# Patient Record
Sex: MALE | Race: WHITE
[De-identification: names, ages, dates, MRNs, and addresses within clinical notes are randomized per-mention and may not be internally consistent; named-entity substitution may affect disease eponyms.]

---

## 2018-01-26 ENCOUNTER — HOSPITAL ENCOUNTER (EMERGENCY)
Dept: HOSPITAL 95 - ER | Age: 78
Discharge: HOME | End: 2018-01-26
Payer: MEDICARE

## 2018-01-26 VITALS — HEIGHT: 74 IN | WEIGHT: 225 LBS | BODY MASS INDEX: 28.88 KG/M2

## 2018-01-26 DIAGNOSIS — K21.9: Primary | ICD-10-CM

## 2018-01-26 DIAGNOSIS — K44.9: ICD-10-CM

## 2018-01-26 DIAGNOSIS — M51.34: ICD-10-CM

## 2018-01-26 LAB
ALBUMIN SERPL BCP-MCNC: 4 G/DL (ref 3.4–5)
ALBUMIN/GLOB SERPL: 1.1 {RATIO} (ref 0.8–1.8)
ALT SERPL W P-5'-P-CCNC: 38 U/L (ref 12–78)
ANION GAP SERPL CALCULATED.4IONS-SCNC: 9 MMOL/L (ref 6–16)
AST SERPL W P-5'-P-CCNC: 18 U/L (ref 12–37)
BASOPHILS # BLD AUTO: 0.04 K/MM3 (ref 0–0.23)
BASOPHILS NFR BLD AUTO: 1 % (ref 0–2)
BILIRUB SERPL-MCNC: 0.3 MG/DL (ref 0.1–1)
BUN SERPL-MCNC: 20 MG/DL (ref 8–24)
CALCIUM SERPL-MCNC: 9.1 MG/DL (ref 8.5–10.1)
CHLORIDE SERPL-SCNC: 109 MMOL/L (ref 98–108)
CO2 SERPL-SCNC: 24 MMOL/L (ref 21–32)
CREAT SERPL-MCNC: 0.97 MG/DL (ref 0.6–1.2)
DEPRECATED RDW RBC AUTO: 42.3 FL (ref 35.1–46.3)
EOSINOPHIL # BLD AUTO: 0.11 K/MM3 (ref 0–0.68)
EOSINOPHIL NFR BLD AUTO: 2 % (ref 0–6)
ERYTHROCYTE [DISTWIDTH] IN BLOOD BY AUTOMATED COUNT: 12.3 % (ref 11.7–14.2)
GLOBULIN SER CALC-MCNC: 3.7 G/DL (ref 2.2–4)
GLUCOSE SERPL-MCNC: 117 MG/DL (ref 70–99)
HCT VFR BLD AUTO: 44.3 % (ref 37–53)
HGB BLD-MCNC: 15.2 G/DL (ref 13.5–17.5)
IMM GRANULOCYTES # BLD AUTO: 0.03 K/MM3 (ref 0–0.1)
IMM GRANULOCYTES NFR BLD AUTO: 0 % (ref 0–1)
LYMPHOCYTES # BLD AUTO: 1.22 K/MM3 (ref 0.84–5.2)
LYMPHOCYTES NFR BLD AUTO: 16 % (ref 21–46)
MCHC RBC AUTO-ENTMCNC: 34.3 G/DL (ref 31.5–36.5)
MCV RBC AUTO: 93 FL (ref 80–100)
MONOCYTES # BLD AUTO: 0.62 K/MM3 (ref 0.16–1.47)
MONOCYTES NFR BLD AUTO: 8 % (ref 4–13)
NEUTROPHILS # BLD AUTO: 5.41 K/MM3 (ref 1.96–9.15)
NEUTROPHILS NFR BLD AUTO: 73 % (ref 41–73)
NRBC # BLD AUTO: 0 K/MM3 (ref 0–0.02)
NRBC BLD AUTO-RTO: 0 /100 WBC (ref 0–0.2)
PLATELET # BLD AUTO: 200 K/MM3 (ref 150–400)
POTASSIUM SERPL-SCNC: 3.8 MMOL/L (ref 3.5–5.5)
PROT SERPL-MCNC: 7.7 G/DL (ref 6.4–8.2)
SODIUM SERPL-SCNC: 142 MMOL/L (ref 136–145)
TROPONIN I SERPL-MCNC: <0.015 NG/ML (ref 0–0.04)

## 2018-02-14 ENCOUNTER — HOSPITAL ENCOUNTER (OUTPATIENT)
Dept: HOSPITAL 95 - LAB | Age: 78
Discharge: HOME | End: 2018-02-14
Attending: DERMATOLOGY
Payer: MEDICARE

## 2018-02-14 DIAGNOSIS — D36.10: Primary | ICD-10-CM

## 2018-09-18 ENCOUNTER — HOSPITAL ENCOUNTER (OUTPATIENT)
Dept: HOSPITAL 95 - LAB SHORT | Age: 78
Discharge: HOME | End: 2018-09-18
Attending: DERMATOLOGY
Payer: MEDICARE

## 2018-09-18 DIAGNOSIS — D48.5: Primary | ICD-10-CM

## 2020-03-12 NOTE — NUR
1345 PATIENT UP TO THE RESTROOM AND DRESSED SELF. ALL BELONGINGS GATHERED BY
PATIENT. PIV REMOVED FROM THE LEFT AC AND TR BAND REMOVED, SITE CLEANED AND
CLOTH DOT PLACED. NO HEMATOMA, NO BLEEDING, NO PAIN NOTED. NO BRUISING NOTED.
REVIEWED AGAIN DISCHARGE INSTRUCTIONS, LIMITS ON MOVEMENT AND LIFTING.
REPLACED WHITE ARMBOARD TO REMIND PATIENT NOT TO USE THE ARM FOR THE REST OF
TODAY. DISCHARGE VIA WHEELCHAIR TO PRIVATE VEHICLE TO HOME.

## 2020-03-12 NOTE — NUR
1130 PATIENT SITTING UP IN RECLINER CHAIR, AAOX3, VVS, SINUS MATT WITH PVC
NOTED. ARM BOARD AND TR BAND IN PLACE TO THE [facility] RADIAL.  PATIENT
INSTRUCTED NOT TO USES THE RIGHT ARM. NO PAIN NOTED FROM THE PATIENT.

## 2020-03-12 NOTE — NUR
10 CC OF AIR REMOVED OUT OF NOW DEFLATED RIGHT TR BAND OVER 15 MINUTES. SOFT
WITH NO HEMATOMA AND NO PULSATILE BLEEDING. RIGHT RADIAL CARE INSTRUCTIONS
REVIEWED WITH PT. CALL LIGHT IN REACH.

## 2020-09-08 NOTE — NUR
SHIFT SUMMARY- PT IS A/O, PLESANT AND COOPERATIVE. HE WAS AN ER ADMIT THIS
AFTERNOON ARRIVED TO THE UNIT AT 1510. HE WAS ORIENTED TO THE ROOM. WIFE AT
BEDSIDE. SPOKE WITH DR. ZAVALA ABOUT ELEVATED PULSE AND DISCUSSED PLAN
FOR PE TREATMENT, LOVONOX RECIEVED IN ED, FURTHER TREATMENT TOMORROW FOR PE.
PT CODE CHANGED TO DNR. PT SPOKE WITH PALATIVE CARE NURSE, AND MYSELF AND WAS
CLEAR ABOUT HIS WISHES, THIS WAS DISCUSSED WITH DR. ZAVALA.

## 2020-09-08 NOTE — NUR
[facility] CARE INITIAL VISIT. - Called with referral to discuss code status and
complete an advanced directive by pt's RN. Pt just admitted to medical floor
from the ER and getting settled in. Visit made as pt expressed concern re: his
desired code status. When I arrive he is dyspnic, resting in bed. CNA still in
room providing care. He states he has no quesitons and wants his code status
to be a DNR. He reports that this is on record with his PCP at [facility]. Pt's wife
had left in the few minutes it took me to get to his room after RN called me.
She evidently stated pt only wanted a DNR in "certain circumstances". Pt is
very clear and relates experiences with multiple family members' illnesses and
lingering with cancer or chronic conditions and he states he wants no part of
that. He again requests DNR status. I assured him we would call our [facility]
hospital Dr and report for order change to code status that agrees with his
wishes. He is alert and oriented. His obvious anxiety appears related to
worry over correct code status. I left some written material to give to wife
and planned with pt to come back in the am and talk with his wife to answer
any of her questions re: advanced care planning and his communicated wishes.
Pt appreciative. Report given to RN. She will call Dr Sanchez for code
status order change per pt's wishes.  Pt denies pain at this time. Will fully
assess for s/s and other means to be of help tomorrow.

## 2020-09-09 NOTE — NUR
DISCHARGE REVIEWED WITH PT. HE STATES VERBAL UNDERSTANDING MEDS AND INST. IV
PULLED INTACT. TELE REMOVED AND RETURNED. WHEELED TO DOOR BY AIDE. 1500

## 2020-09-09 NOTE — NUR
SHIFT SUMMARY
 
PT HAS RESTED MOST OF THE NIGHT WITHOUT EVENT. PT SOB WITH EXERTION, 2L O2 IN
PLACE. PT QUICKLY RECOVERS WITH REST. NO HEMOTYPSIS. LUNGS CLEAR WITH
AUSCULATION. XARELTO STARTED HS AS ORDERED. TELE IN PLACE AFIB RATE 'S.
IVF INFUSING.  PT HAS BEEN SBA TO THE [facility]. A/OX4, BUT [place]. HAS DENIED NEEDS
MOST OF THE NIGHT. BED IN LOWEST POSITION, CALL LIGHT WITHIN REACH.

## 2020-09-09 NOTE — NUR
PT PLEASANT COOP A/OX3.  DENIES PAIN OR SOB. H/R REG, NO MURMER NOTED. PER
TELE S TACH  WITH 107. LUNGS CLEAR T/O. DENIES PAIN OR SOB IN CHEST. ON
2L O2 AT THIS TIME. RESP EASY, UNLABORED. BT X4 LAST BM YEST PER PT. VOIDS
INDEPENDANT TO BATHROOM. BED IN LOW POSITION, CALL LITE IN REACH, CALLS APPROP